# Patient Record
Sex: MALE | Race: WHITE | NOT HISPANIC OR LATINO | Employment: OTHER | URBAN - METROPOLITAN AREA
[De-identification: names, ages, dates, MRNs, and addresses within clinical notes are randomized per-mention and may not be internally consistent; named-entity substitution may affect disease eponyms.]

---

## 2020-01-15 ENCOUNTER — TELEPHONE (OUTPATIENT)
Dept: GERIATRICS | Age: 65
End: 2020-01-15

## 2020-01-15 NOTE — TELEPHONE ENCOUNTER
[address]  [phone]  Telephone Intake     Referral Source: Postcard       Patients PCP: currently Joellen Pulliam,  (Intends to change to Dr Wojciech Thomas after Burke Roger Assess  PCP phone number: in chart    Caller (and relationship to patient): OWEN Reddy 20 Person (relationship to patient): Wife   Phone Number: [phone]   Is caller POA? no (please provide wife with information concerning POA, or any other suggestions that will help with this area)    Reason for referral: Memory Loss, fairly advanced dementia    Others residing with patient: Spouse    Has the patient ever been formally tested for memory/dementia? Yes     Has the patient ever been diagnosed with dementia? Yes     Has the patient been seen by a Neurologist? Yes     What is the goal of visit? Wife needs help with care giving direction  Would like to bring in help during the week so wife can go out of the house  Also needs approval of physician for the long term health insurance the family has so that home care will be covered by this insurance  Preferred language? English  Highest education level?  (Denis Marsh)  Can patient read English? Yes     First Visit: 1/21/20 @ 1pm  Conference Visit: 2/11/20 @ 1pm    This patient received our POSTCARD  They will also like to see Dr Wojciech Thomas for PCP and establish care   Need to schedule appointment after Sharene Case

## 2020-01-21 ENCOUNTER — OFFICE VISIT (OUTPATIENT)
Dept: GERIATRICS | Age: 65
End: 2020-01-21
Payer: MEDICARE

## 2020-01-21 VITALS
BODY MASS INDEX: 23.24 KG/M2 | HEART RATE: 78 BPM | SYSTOLIC BLOOD PRESSURE: 102 MMHG | DIASTOLIC BLOOD PRESSURE: 60 MMHG | TEMPERATURE: 96.4 F | HEIGHT: 66 IN | WEIGHT: 144.6 LBS | OXYGEN SATURATION: 78 %

## 2020-01-21 DIAGNOSIS — R26.2 AMBULATORY DYSFUNCTION: ICD-10-CM

## 2020-01-21 DIAGNOSIS — G30.0 EARLY ONSET ALZHEIMER'S DEMENTIA WITHOUT BEHAVIORAL DISTURBANCE (HCC): Primary | ICD-10-CM

## 2020-01-21 DIAGNOSIS — F33.0 MILD EPISODE OF RECURRENT MAJOR DEPRESSIVE DISORDER (HCC): ICD-10-CM

## 2020-01-21 DIAGNOSIS — F02.80 EARLY ONSET ALZHEIMER'S DEMENTIA WITHOUT BEHAVIORAL DISTURBANCE (HCC): Primary | ICD-10-CM

## 2020-01-21 DIAGNOSIS — R63.4 WEIGHT LOSS: ICD-10-CM

## 2020-01-21 DIAGNOSIS — R15.9 FULL INCONTINENCE OF FECES: ICD-10-CM

## 2020-01-21 DIAGNOSIS — H54.7 VISUAL IMPAIRMENT: ICD-10-CM

## 2020-01-21 PROCEDURE — 99205 OFFICE O/P NEW HI 60 MIN: CPT | Performed by: STUDENT IN AN ORGANIZED HEALTH CARE EDUCATION/TRAINING PROGRAM

## 2020-01-21 RX ORDER — ACETAMINOPHEN 325 MG/1
325 TABLET ORAL EVERY 6 HOURS PRN
COMMUNITY
End: 2020-07-15 | Stop reason: ALTCHOICE

## 2020-01-21 RX ORDER — SERTRALINE HYDROCHLORIDE 100 MG/1
100 TABLET, FILM COATED ORAL
COMMUNITY
Start: 2019-12-09

## 2020-01-21 RX ORDER — IBUPROFEN 200 MG
200 TABLET ORAL EVERY 6 HOURS PRN
COMMUNITY
End: 2020-01-22
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
## 2020-01-22 PROBLEM — F33.0 MILD EPISODE OF RECURRENT MAJOR DEPRESSIVE DISORDER (HCC): Status: ACTIVE | Noted: 2020-01-22

## 2020-01-22 PROBLEM — F02.80 EARLY ONSET ALZHEIMER'S DEMENTIA WITHOUT BEHAVIORAL DISTURBANCE (HCC): Status: ACTIVE | Noted: 2020-01-22

## 2020-01-22 PROBLEM — R63.4 WEIGHT LOSS: Status: ACTIVE | Noted: 2020-01-22

## 2020-01-22 PROBLEM — R15.9 INCONTINENCE OF FECES: Status: ACTIVE | Noted: 2020-01-22

## 2020-01-22 PROBLEM — H54.7 VISUAL IMPAIRMENT: Status: ACTIVE | Noted: 2020-01-22

## 2020-01-22 PROBLEM — R26.2 AMBULATORY DYSFUNCTION: Status: ACTIVE | Noted: 2020-01-22

## 2020-01-22 PROBLEM — G30.0 EARLY ONSET ALZHEIMER'S DEMENTIA WITHOUT BEHAVIORAL DISTURBANCE (HCC): Status: ACTIVE | Noted: 2020-01-22

## 2020-01-22 PROBLEM — F32.A DEPRESSION: Status: ACTIVE | Noted: 2020-01-22

## 2020-01-22 NOTE — ASSESSMENT & PLAN NOTE
Wife declines referral to Gastroenterology  Recommend scheduled toileting every 2-3 hours once patient is awake

## 2020-01-22 NOTE — ASSESSMENT & PLAN NOTE
Patient able to complete GDS  pHq-2 positive based on wife's history  Encourage patient remain active mentally, physically and socially  Continue Zoloft 100 mg daily  Patient has not had suicidal or homicidal ideation  Continued social support by family and friends  Would also recommend patient follow-up with Psychiatry the wife has declined this  Recommend referral to nursing home for admission to a locked dementia unit

## 2020-01-22 NOTE — ASSESSMENT & PLAN NOTE
MOCA 0, TUGT 16, patient unable to answer questionnaire for GDS  Patient with known history of dementia likely mixed in etiology frontotemporal lobe dementia versus Alzheimer's dementia  Given patient's early onset of symptoms in his 46s and strong family history of early-onset dementia, per Neurology and geriatrics, unsure of  definitive diagnosis  Wife had declined PET scan in the past to differentiate between these 2 diagnoses  Progressive decline in patient's [place] scores from 2014 with a score of 28, MMSE 13 in 2019, Slums 8 in 2019 to now being unable to complete the [facility] today  Patient is fully dependent in all ADLs and IADLs and has begun resisting cares with worsening urinary and stool incontinence  Wife declines any intensive workup including blood or imaging at this time as she explains she has completed this in the past   Given patient's current level of severe dementia and decreased functionality with global aphasia, it was recommended to patient's wife that he meets the criteria for a higher level of care and I would recommend him being placed into a nursing home facility, more specifically a locked dementia unit as he does have a high risk of wandering and requires 24/7 supervised care  Wife explained that she did look at assisted living facilities however I disagreed with placement into an assisted living facility as he requires a higher level of care in a nursing home    MSW from our office to reach out to patient's wife to discuss resources available  Wife to contact her long-term care insurance about coverage start date  Also recommended a temporary home health agency to assist in cares of patient so that wife may be able to go out and assess nursing homes she would like the patient placed in  Patient does not have any aggressive behaviors at this time  Will continue current regimen of Zoloft 100 mg daily as wife is hesitant to start new medications  Wife did inquire about restarting  Aricept which I advised against given side effect profile and prior inefficacy in his past treatment  Studies have shown only small benefits on some but not all cognitive and functional outcomes in short-term trials with Aricept in patients with advanced dementia  Additionally data do not support the use of cholinesterase inhibitors in FTD except if there is uncertainty about whether the patient has FTD or Alzheimer's disease which has already been tried in this patient and failed     Recommend reorientation and redirection as needed  Manage chronic conditions per PCP  Maintain [place] precautions  Encourage patient remain active mentally, physically and socially  Also encourage patient to participate in cognitively challenging exercises such as cross words and puzzles  Given patient's late stage of dementia at this time, he will would likely not benefit from a referral to speech therapy for cognitive memory recall  Wife to send paperwork for admission to nursing home of her choice remainder complete  Recommend wife also speak with an  to finalize power of  and Living Will for patient  Advised any changes in behaviors acutely to call my office  Wife would benefit from a care support group  Will follow-up as needed

## 2020-01-22 NOTE — ASSESSMENT & PLAN NOTE
TUGT 16 sec  Slowed gait  Patient may benefit from referral to physical therapy however given his severity of dementia and constant need for reorientation and redirection, patient would likely not be able to follow commands with physical therapy  Maintain Falls precautions

## 2020-01-22 NOTE — ASSESSMENT & PLAN NOTE
Manage chronic conditions  Maintain Falls precautions  Discussed with wife in detail the importance of following up with an ophthalmology appointment as this may decrease patient's confusion given his marked visual impairment  Wife agreed to schedule an appointment

## 2020-01-22 NOTE — ASSESSMENT & PLAN NOTE
Weight loss attributed to marked decrease in oral intake  Encourage supplements with Ensure  Wife declines any aggressive workup today for blood work, imaging or referral to other subspecialties  Encourage oral intake  Patient may benefit from mirtazapine however wife hesitant about starting any new medications today  Follow up with PCP for continued monitoring

## 2020-01-23 ENCOUNTER — TELEPHONE (OUTPATIENT)
Dept: GERIATRICS | Age: 65
End: 2020-01-23

## 2020-01-23 NOTE — TELEPHONE ENCOUNTER
Spoke with Manny Johnson today as Dr Alvaro Beasley notes she is in need of resources for in-home care and senior placement for Marvin Toney  I spoke about options with Mina Dewitt and she mentioned having talked with [facility]  I will email her home-care options ([facility], [facility], and [facility]) and senior placement options ([facility], [facility], and [facility]) and provide her with my direct line for future contact       Heena's email address is [email or web address]

## 2020-02-14 ENCOUNTER — TELEPHONE (OUTPATIENT)
Dept: GERIATRICS | Age: 65
End: 2020-02-14

## 2020-02-14 NOTE — TELEPHONE ENCOUNTER
Spoke with Robert Cooney twice, once  To explain as Dr Daryn Villanueva requested earlier in this thread  As she explained she has been very actively seeking placement as recommended by Dr Daryn Villanueva her hesitation for making the actual switch of Primary care providers until this process is finalized  Once again to schedule an appointment    She accepted the need for the EKG as good news and we scheduled for Monday late morning

## 2020-02-14 NOTE — TELEPHONE ENCOUNTER
He would need to be assessed and have an EKG and bloodwork  Wife should also be aware that the side effects of antipsycotics need to be discussed in detail and it is not a medicine I would start over the phone without assessing the pt  An MA should call her back about this    Thanks

## 2020-02-14 NOTE — TELEPHONE ENCOUNTER
Wife stopped in and inquired about something to help Filippo salas  Currently takes Melatonin, but relayed that it does not help  Wife heard of Seroquel and wondered if this would be a possibility     Reports that frequently, he cannot fall asleep until about 3am or 4am

## 2020-02-17 ENCOUNTER — OFFICE VISIT (OUTPATIENT)
Dept: GERIATRICS | Age: 65
End: 2020-02-17
Payer: MEDICARE

## 2020-02-17 ENCOUNTER — APPOINTMENT (OUTPATIENT)
Dept: LAB | Facility: CLINIC | Age: 65
End: 2020-02-17
Payer: MEDICARE

## 2020-02-17 VITALS
BODY MASS INDEX: 23.42 KG/M2 | WEIGHT: 149.2 LBS | TEMPERATURE: 98 F | OXYGEN SATURATION: 97 % | SYSTOLIC BLOOD PRESSURE: 108 MMHG | HEART RATE: 76 BPM | HEIGHT: 67 IN | RESPIRATION RATE: 12 BRPM | DIASTOLIC BLOOD PRESSURE: 68 MMHG

## 2020-02-17 DIAGNOSIS — H54.7 VISUAL IMPAIRMENT: ICD-10-CM

## 2020-02-17 DIAGNOSIS — F02.80 EARLY ONSET ALZHEIMER'S DEMENTIA WITHOUT BEHAVIORAL DISTURBANCE (HCC): Primary | ICD-10-CM

## 2020-02-17 DIAGNOSIS — R26.2 AMBULATORY DYSFUNCTION: ICD-10-CM

## 2020-02-17 DIAGNOSIS — F02.80 EARLY ONSET ALZHEIMER'S DEMENTIA WITHOUT BEHAVIORAL DISTURBANCE (HCC): ICD-10-CM

## 2020-02-17 DIAGNOSIS — G47.09 OTHER INSOMNIA: ICD-10-CM

## 2020-02-17 DIAGNOSIS — G30.0 EARLY ONSET ALZHEIMER'S DEMENTIA WITHOUT BEHAVIORAL DISTURBANCE (HCC): Primary | ICD-10-CM

## 2020-02-17 DIAGNOSIS — G30.0 EARLY ONSET ALZHEIMER'S DEMENTIA WITHOUT BEHAVIORAL DISTURBANCE (HCC): ICD-10-CM

## 2020-02-17 DIAGNOSIS — Z23 NEED FOR INFLUENZA VACCINATION: ICD-10-CM

## 2020-02-17 DIAGNOSIS — F33.0 MILD EPISODE OF RECURRENT MAJOR DEPRESSIVE DISORDER (HCC): ICD-10-CM

## 2020-02-17 DIAGNOSIS — R15.9 FULL INCONTINENCE OF FECES: ICD-10-CM

## 2020-02-17 LAB
ALBUMIN SERPL BCP-MCNC: 3.8 G/DL (ref 3.5–5)
ALP SERPL-CCNC: 55 U/L (ref 46–116)
ALT SERPL W P-5'-P-CCNC: 23 U/L (ref 12–78)
ANION GAP SERPL CALCULATED.3IONS-SCNC: 3 MMOL/L (ref 4–13)
AST SERPL W P-5'-P-CCNC: 13 U/L (ref 5–45)
BASOPHILS # BLD AUTO: 0.07 THOUSANDS/ΜL (ref 0–0.1)
BASOPHILS NFR BLD AUTO: 1 % (ref 0–1)
BILIRUB SERPL-MCNC: 0.47 MG/DL (ref 0.2–1)
BUN SERPL-MCNC: 32 MG/DL (ref 5–25)
CALCIUM SERPL-MCNC: 8.9 MG/DL (ref 8.3–10.1)
CHLORIDE SERPL-SCNC: 112 MMOL/L (ref 100–108)
CO2 SERPL-SCNC: 25 MMOL/L (ref 21–32)
CREAT SERPL-MCNC: 0.74 MG/DL (ref 0.6–1.3)
EOSINOPHIL # BLD AUTO: 0.14 THOUSAND/ΜL (ref 0–0.61)
EOSINOPHIL NFR BLD AUTO: 2 % (ref 0–6)
ERYTHROCYTE [DISTWIDTH] IN BLOOD BY AUTOMATED COUNT: 14.4 % (ref 11.6–15.1)
GFR SERPL CREATININE-BSD FRML MDRD: 96 ML/MIN/1.73SQ M
GLUCOSE SERPL-MCNC: 108 MG/DL (ref 65–140)
HCT VFR BLD AUTO: 38.6 % (ref 36.5–49.3)
HGB BLD-MCNC: 12.5 G/DL (ref 12–17)
IMM GRANULOCYTES # BLD AUTO: 0.02 THOUSAND/UL (ref 0–0.2)
IMM GRANULOCYTES NFR BLD AUTO: 0 % (ref 0–2)
LYMPHOCYTES # BLD AUTO: 1 THOUSANDS/ΜL (ref 0.6–4.47)
LYMPHOCYTES NFR BLD AUTO: 15 % (ref 14–44)
MCH RBC QN AUTO: 32.2 PG (ref 26.8–34.3)
MCHC RBC AUTO-ENTMCNC: 32.4 G/DL (ref 31.4–37.4)
MCV RBC AUTO: 100 FL (ref 82–98)
MONOCYTES # BLD AUTO: 0.57 THOUSAND/ΜL (ref 0.17–1.22)
MONOCYTES NFR BLD AUTO: 8 % (ref 4–12)
NEUTROPHILS # BLD AUTO: 4.99 THOUSANDS/ΜL (ref 1.85–7.62)
NEUTS SEG NFR BLD AUTO: 74 % (ref 43–75)
NRBC BLD AUTO-RTO: 0 /100 WBCS
PLATELET # BLD AUTO: 241 THOUSANDS/UL (ref 149–390)
PMV BLD AUTO: 11.3 FL (ref 8.9–12.7)
POTASSIUM SERPL-SCNC: 4 MMOL/L (ref 3.5–5.3)
PROT SERPL-MCNC: 6.8 G/DL (ref 6.4–8.2)
RBC # BLD AUTO: 3.88 MILLION/UL (ref 3.88–5.62)
SODIUM SERPL-SCNC: 140 MMOL/L (ref 136–145)
TSH SERPL DL<=0.05 MIU/L-ACNC: 1.37 UIU/ML (ref 0.36–3.74)
WBC # BLD AUTO: 6.79 THOUSAND/UL (ref 4.31–10.16)

## 2020-02-17 PROCEDURE — 90662 IIV NO PRSV INCREASED AG IM: CPT | Performed by: STUDENT IN AN ORGANIZED HEALTH CARE EDUCATION/TRAINING PROGRAM

## 2020-02-17 PROCEDURE — 85025 COMPLETE CBC W/AUTO DIFF WBC: CPT

## 2020-02-17 PROCEDURE — 80053 COMPREHEN METABOLIC PANEL: CPT

## 2020-02-17 PROCEDURE — 84443 ASSAY THYROID STIM HORMONE: CPT

## 2020-02-17 PROCEDURE — 36415 COLL VENOUS BLD VENIPUNCTURE: CPT

## 2020-02-17 PROCEDURE — 99215 OFFICE O/P EST HI 40 MIN: CPT | Performed by: STUDENT IN AN ORGANIZED HEALTH CARE EDUCATION/TRAINING PROGRAM

## 2020-02-17 PROCEDURE — G0008 ADMIN INFLUENZA VIRUS VAC: HCPCS | Performed by: STUDENT IN AN ORGANIZED HEALTH CARE EDUCATION/TRAINING PROGRAM

## 2020-02-17 RX ORDER — ASPIRIN 81 MG/1
81 TABLET ORAL DAILY
COMMUNITY
End: 2020-07-15 | Stop reason: ALTCHOICE

## 2020-02-17 RX ORDER — QUETIAPINE FUMARATE 25 MG/1
12.5 TABLET, FILM COATED ORAL DAILY
Qty: 30 TABLET | Refills: 0 | Status: SHIPPED | OUTPATIENT
Start: 2020-02-17 | End: 2020-07-15 | Stop reason: ALTCHOICE

## 2020-02-17 NOTE — PROGRESS NOTES
[facility]  [address] [facility], [address] [place] [address], [address] [place] [address]    PRIMARY CARE PRACTICE FOR OLDER ADULTS      NAME: Cristopher Aragon  AGE: 75 y o  SEX: male    DATE OF ENCOUNTER:  02/17/2020    Assessment and Plan     Problem List Items Addressed This Visit        Nervous and Auditory    Early onset Alzheimer's dementia without behavioral disturbance ([facility] [address] ) - Primary     MocA 0, 2nd attempt  Patient currently on Zoloft 100 mg daily  Will start Seroquel 12 5 mg at nighttime  EKG done in office today showed   Will order a CMP to determine sodium levels  Patient to follow up in 1 month for medication assessment  Wife to call the office should any worsening of behaviors occur since upon starting Seroquel  Educated wife in detail on side effect profile of antipsychotic therapy such as Seroquel and increased risk of death in patients with dementia  Given patient's current severity of dementia and behavioral disturbance, wife is in agreement to give a trial with antipsychotic therapy  Recommend discontinuation of melatonin so that patient is more awake during the daytime to participate in cognitive activities  Forms filled out for respite admission to [facility] dementia unit  POLST completed with comfort care measures as a goal and scanned  Recommend reorientation and redirection as needed  Manage chronic conditions  Maintain Falls precautions  Encourage patient remain active mentally, physically and socially  Encourage patient to participate in cognitively challenging exercises such as cross words and puzzles  Agree completely with patient moving to a higher level of care  Discussed with patient's wife that patient does require a nursing home level of care as patient does should require 24/7 supervision and clinical staff  Wife explains that she would like to place the patient in a dementia unit initially and will make a final decision after his respite time    Discontinue melatonin as patient has been excessively drowsy during the daytime and is unable to participate in social and neurocognitive activities with his home health aide staff               Relevant Medications    QUEtiapine (SEROquel) 25 mg tablet    Other Relevant Orders    CBC and differential (Completed)    Comprehensive metabolic panel (Completed)       Other    Visual impairment     Maintain Falls precautions  Patient to follow-up with Ophthalmology as scheduled as an outpatient         Incontinence of feces     Recommend scheduled toileting every 2-3 hours once awake  Will continue to monitor           Ambulatory dysfunction     TUGT 16 sec  Patient has persistingly slowed gait  Wife did not follow through with physical therapy referral and declines at this time as patient will be going into respite care  Maintain Falls precautions         Mild episode of recurrent major depressive disorder ([facility] [address] )     Mood has remained stable per wife  Patient has not had any suicidal or homicidal ideation  Patient currently has home health aides coming in and he does participate in social activities with them  Patient will be going to a 2 week respite trial at [address] dementia unit  Will continue Zoloft 100 mg daily for now  Will start Seroquel 12 5 mg daily  Will order a CMP to monitor sodium levels and EKG showed QTC of 437  Will continue to follow         Relevant Medications    QUEtiapine (SEROquel) 25 mg tablet    Other Relevant Orders    TSH, 3rd generation with T4 reflex (Completed)    Other insomnia     Discontinue melatonin as patient has been excessively sleepy during the daytime and is unable to participate in social and cognitive activities with his home health aides  Will start Seroquel 12 5 mg p o  HS as this may also proved to be beneficial with the patient's sleeping pattern  Wife educated on importance of having the patient avoid daytime napping  Avoid caffeinated beverages after 14:00  Will continue to follow Relevant Orders    TSH, 3rd generation with T4 reflex (Completed)      Other Visit Diagnoses     Need for influenza vaccination        Relevant Orders    influenza vaccine, [id number], high-dose, PF 0 5 mL (FLUZONE HIGH-DOSE) (Completed)                - Counseling Documentation: patient was counseled regarding: diagnostic results, instructions for management, risk factor reductions, prognosis, patient and family education, impressions, risks and benefits of treatment options and importance of compliance with treatment  Discussed with patient's wife    Chief Complaint     Patient presents to establish care at our primary care practice for older adults and is also being seen for follow-up of his dementia    History of Present Illness     HPI  This is a 76-year-old male with depression, early-onset severe Alzheimer's disease with behavioral disturbance, insomnia, urine and stool incontinence, vision impairment and ambulatory dysfunction who presents to establish care at our primary care practice for older adults  Patient was previously seen for a comprehensive geriatric assessment with a diagnosis of early-onset dementia secondary to severe Alzheimer's etiology with behavioral disturbance  At that visit, it was advised that the patient be transferred to higher level of care as the patient's wife appeared overwhelmed and the situation was assessed that it was in both the patient and his wife's best interest that he be transferred to a higher level of care  Today the patient's wife has finally decided she will place the patient for 2 weeks respite at [address] [place] [address] dementia unit  Despite having home health services, she has now realized at that patient does require a higher level of care  The patient does have long-term care insurance  The wife has also brought a POLST form to be completed with a goal of comfort care measures  Patient's wife is his legal POA    Wife explains that patient has had no improvement in his symptoms  He has now begun sundowning from 16:00  Behaviors include an insatiable appetite which begins by him arguing with her in the late evenings and eating everything in his sight  She gives examples of the patient eating raw eggs, drinking a bottle of maple syrup and requesting to eat raw meat as he is too hungry to wait for her prepared meals  She describes him as manic during this time  Both the patient and wife get into fights daily as wife explains that she argues with the patient in an effort to reorient him so that he avoids becoming sick from eating raw foods  His memory continues to progressively decline and he is minimally verbal during most of the day  He continues to have urinary and stool incontinence and has persistently resisted her care for taking showers, medications or participating in cognitive activities  Wife explains that one of her family friends suggested using Seroquel as well as the admission team at [address] [place] [address] dementia unit  As a result of which she is here today to address his behavioral changes  The patient was also recently started on melatonin 1 week ago after which she was noted to be sleeping excessively during the daytime  Per wife, the patient typically stays awake until 1-2 a m  And then subsequently sleeps until 09:30 since starting melatonin  Home health aides who visit during the daytime over the last few days have noted the patient to be excessively drowsy as a result of which he is not able to participate in usual activities with them during the daytime        The following portions of the patient's history were reviewed and updated as appropriate: allergies, current medications, past family history, past medical history, past social history, past surgical history and problem list     Review of Systems     Review of Systems   Unable to perform ROS: Dementia       Active Problem List     Patient Active Problem List   Diagnosis    Early onset Alzheimer's dementia without behavioral disturbance (HCC)    Visual impairment    Incontinence of feces    Weight loss    Ambulatory dysfunction    Mild episode of recurrent major depressive disorder (HCC)    Other insomnia       Objective     /68 (BP Location: Left arm, Patient Position: Sitting, Cuff Size: Adult)   Pulse 76   Temp 98 °F (36 7 °C) (Temporal)   Resp 12   Ht 5' 7" (1 702 m) Comment: with shoes  Wt 67 7 kg (149 lb 3 2 oz) Comment: with shoes  SpO2 97%   BMI 23 37 kg/m²     Physical Exam   Constitutional: No distress  Male patient sitting quietly on chair, intermittently falling asleep, in no obvious cardiorespiratory or painful distress   HENT:   Head: Normocephalic and atraumatic  Right Ear: External ear normal    Left Ear: External ear normal    Nose: Nose normal    Eyes: Conjunctivae are normal  Right eye exhibits no discharge  Left eye exhibits no discharge  No scleral icterus  Neck: Normal range of motion  Neck supple  No JVD present  Cardiovascular: Normal rate, regular rhythm, normal heart sounds and intact distal pulses  Exam reveals no gallop and no friction rub  No murmur heard  Pulmonary/Chest: Effort normal and breath sounds normal  No stridor  No respiratory distress  He has no wheezes  He has no rales  Abdominal: Soft  Bowel sounds are normal  He exhibits no distension and no mass  There is no tenderness  There is no guarding  Musculoskeletal: Normal range of motion  He exhibits no edema, tenderness or deformity  Neurological: He is alert  He has normal reflexes  He exhibits normal muscle tone  Patient unable to verbalize his name  Oriented x 0  Moving all limbs  Unable to follow commands as during prior visit  Very slowed gait   Skin: Skin is warm  No rash noted  He is not diaphoretic  No erythema  No pallor  Psychiatric:   Patient pleasantly confused at baseline   Vitals reviewed        Pertinent Laboratory/Diagnostic Studies:        Current Medications Current Outpatient Medications:     acetaminophen (TYLENOL) 325 mg tablet, Take 325 mg by mouth every 6 (six) hours as needed, Disp: , Rfl:     ACIDOPHILUS LACTOBACILLUS PO, Take by mouth, Disp: , Rfl:     aspirin (ECOTRIN LOW STRENGTH) 81 mg EC tablet, Take 81 mg by mouth daily, Disp: , Rfl:     KRILL OIL PO, Take by mouth daily, Disp: , Rfl:     Multiple Vitamins-Minerals (MULTIVITAMIN ADULTS PO), , Disp: , Rfl:     sertraline (ZOLOFT) 100 mg tablet, Take 100 mg by mouth, Disp: , Rfl:     TURMERIC PO, , Disp: , Rfl:     QUEtiapine (SEROquel) 25 mg tablet, Take 0 5 tablets (12 5 mg total) by mouth daily, Disp: 30 tablet, Rfl: 0    Health Maintenance     Health Maintenance   Topic Date Due    Hepatitis C Screening  1955    Medicare Annual Wellness Visit (AWV)  1955    CRC Screening: Colonoscopy  1955    Fall Risk  03/31/2018    Pneumococcal Vaccine: 65+ Years (1 of 2 - PCV13) 03/31/2018    BMI: Adult  02/17/2021    DTaP,Tdap,and Td Vaccines (2 - Td) 01/23/2027    Influenza Vaccine  Completed    Pneumococcal Vaccine: Pediatrics (0 to 5 Years) and At-Risk Patients (6 to 59 Years)  Aged Out    HIB Vaccine  Aged Out    Hepatitis B Vaccine  Aged Out    IPV Vaccine  Aged Out    Hepatitis A Vaccine  Aged Out    Meningococcal ACWY Vaccine  Aged Out    HPV Vaccine  Aged [employer] History   Administered Date(s) Administered    Influenza, high dose seasonal 0 5 mL 02/17/2020       Lucero Marie MD  Geriatrics   2/18/2020 7:24 AM

## 2020-02-18 NOTE — ASSESSMENT & PLAN NOTE
Mood has remained stable per wife  Patient has not had any suicidal or homicidal ideation  Patient currently has home health aides coming in and he does participate in social activities with them  Patient will be going to a 2 week respite trial at [address] dementia unit  Will continue Zoloft 100 mg daily for now  Will start Seroquel 12 5 mg daily  Will order a CMP to monitor sodium levels and EKG showed QTC of 437  Will continue to follow

## 2020-02-18 NOTE — ASSESSMENT & PLAN NOTE
Discontinue melatonin as patient has been excessively sleepy during the daytime and is unable to participate in social and cognitive activities with his home health aides  Will start Seroquel 12 5 mg p o  HS as this may also proved to be beneficial with the patient's sleeping pattern  Wife educated on importance of having the patient avoid daytime napping  Avoid caffeinated beverages after 14:00  Will continue to follow

## 2020-02-18 NOTE — ASSESSMENT & PLAN NOTE
MocA 0, 2nd attempt  Patient currently on Zoloft 100 mg daily  Will start Seroquel 12 5 mg at nighttime  EKG done in office today showed   Will order a CMP to determine sodium levels  Patient to follow up in 1 month for medication assessment  Wife to call the office should any worsening of behaviors occur since upon starting Seroquel  Educated wife in detail on side effect profile of antipsychotic therapy such as Seroquel and increased risk of death in patients with dementia  Given patient's current severity of dementia and behavioral disturbance, wife is in agreement to give a trial with antipsychotic therapy  Recommend discontinuation of melatonin so that patient is more awake during the daytime to participate in cognitive activities  Forms filled out for respite admission to [facility] dementia unit  POLST completed with comfort care measures as a goal and scanned  Recommend reorientation and redirection as needed  Manage chronic conditions  Maintain Falls precautions  Encourage patient remain active mentally, physically and socially  Encourage patient to participate in cognitively challenging exercises such as cross words and puzzles  Agree completely with patient moving to a higher level of care  Discussed with patient's wife that patient does require a nursing home level of care as patient does should require 24/7 supervision and clinical staff  Wife explains that she would like to place the patient in a dementia unit initially and will make a final decision after his respite time  Discontinue melatonin as patient has been excessively drowsy during the daytime and is unable to participate in social and neurocognitive activities with his home health aide staff

## 2020-02-18 NOTE — ASSESSMENT & PLAN NOTE
TUGT 16 sec  Patient has persistingly slowed gait  Wife did not follow through with physical therapy referral and declines at this time as patient will be going into respite care  Maintain Falls precautions

## 2020-03-11 ENCOUNTER — TELEPHONE (OUTPATIENT)
Dept: GERIATRICS | Age: 65
End: 2020-03-11

## 2020-03-11 NOTE — TELEPHONE ENCOUNTER
Patient's wife stopped into office to relay that she needs to cancel the appointment for 3/16/20  Patient is at [facility] for the short term; but is deciding to keep him there for long term  Cancelled 3/16/20 appointment  Wife is unsure if Lissy García should be followed by the Providers at [facility] or if she should try to bring him here to this office  Please advise

## 2020-03-12 NOTE — TELEPHONE ENCOUNTER
Left voicemail message on patient's wife's cell phone  Relayed information, provided practice phone number and requested she call if she has further questions

## 2020-03-12 NOTE — TELEPHONE ENCOUNTER
Usually if there is a provider at the facility, it's more convenient for the pt to see their physician, however if she wants, I can certainly keep seeing him at my office  Not sure what [facility] policy is either, so it also depends on that  There may be a PCP there but she can come to us in the capacity of geriatrics speciality if she wishes

## 2020-05-09 DIAGNOSIS — F02.80 EARLY ONSET ALZHEIMER'S DEMENTIA WITHOUT BEHAVIORAL DISTURBANCE (HCC): ICD-10-CM

## 2020-05-09 DIAGNOSIS — G30.0 EARLY ONSET ALZHEIMER'S DEMENTIA WITHOUT BEHAVIORAL DISTURBANCE (HCC): ICD-10-CM

## 2020-05-09 RX ORDER — QUETIAPINE FUMARATE 25 MG/1
TABLET, FILM COATED ORAL
Qty: 30 TABLET | Refills: 0 | OUTPATIENT
Start: 2020-05-09

## 2020-06-17 ENCOUNTER — NURSING HOME VISIT (OUTPATIENT)
Dept: GERIATRICS | Facility: OTHER | Age: 65
End: 2020-06-17
Payer: MEDICARE

## 2020-06-17 VITALS
RESPIRATION RATE: 16 BRPM | OXYGEN SATURATION: 96 % | BODY MASS INDEX: 22.02 KG/M2 | DIASTOLIC BLOOD PRESSURE: 92 MMHG | WEIGHT: 140.6 LBS | TEMPERATURE: 98 F | HEART RATE: 82 BPM | SYSTOLIC BLOOD PRESSURE: 146 MMHG

## 2020-06-17 DIAGNOSIS — G31.09 FRONTOTEMPORAL DEMENTIA (HCC): Primary | ICD-10-CM

## 2020-06-17 DIAGNOSIS — L85.3 XEROSIS CUTIS: ICD-10-CM

## 2020-06-17 DIAGNOSIS — R26.2 AMBULATORY DYSFUNCTION: ICD-10-CM

## 2020-06-17 DIAGNOSIS — G47.09 OTHER INSOMNIA: ICD-10-CM

## 2020-06-17 DIAGNOSIS — N17.9 AKI (ACUTE KIDNEY INJURY) (HCC): ICD-10-CM

## 2020-06-17 DIAGNOSIS — F02.80 FRONTOTEMPORAL DEMENTIA (HCC): Primary | ICD-10-CM

## 2020-06-17 DIAGNOSIS — R91.8 GROUND GLASS OPACITY PRESENT ON IMAGING OF LUNG: ICD-10-CM

## 2020-06-17 PROBLEM — K56.41 FECAL IMPACTION (HCC): Status: ACTIVE | Noted: 2020-05-24

## 2020-06-17 PROCEDURE — 99305 1ST NF CARE MODERATE MDM 35: CPT | Performed by: FAMILY MEDICINE

## 2020-06-18 PROBLEM — L85.3 XEROSIS CUTIS: Status: ACTIVE | Noted: 2020-06-18

## 2020-06-19 ENCOUNTER — NURSING HOME VISIT (OUTPATIENT)
Dept: GERIATRICS | Facility: OTHER | Age: 65
End: 2020-06-19
Payer: MEDICARE

## 2020-06-19 DIAGNOSIS — G31.09 FRONTOTEMPORAL DEMENTIA (HCC): ICD-10-CM

## 2020-06-19 DIAGNOSIS — F02.80 FRONTOTEMPORAL DEMENTIA (HCC): ICD-10-CM

## 2020-06-19 DIAGNOSIS — U07.1 PNEUMONIA DUE TO COVID-19 VIRUS: Primary | ICD-10-CM

## 2020-06-19 DIAGNOSIS — R26.2 AMBULATORY DYSFUNCTION: ICD-10-CM

## 2020-06-19 DIAGNOSIS — N17.9 AKI (ACUTE KIDNEY INJURY) (HCC): ICD-10-CM

## 2020-06-19 DIAGNOSIS — J12.82 PNEUMONIA DUE TO COVID-19 VIRUS: Primary | ICD-10-CM

## 2020-06-19 PROCEDURE — 99309 SBSQ NF CARE MODERATE MDM 30: CPT | Performed by: NURSE PRACTITIONER

## 2020-06-22 ENCOUNTER — NURSING HOME VISIT (OUTPATIENT)
Dept: GERIATRICS | Facility: OTHER | Age: 65
End: 2020-06-22
Payer: MEDICARE

## 2020-06-22 DIAGNOSIS — G31.09 FRONTOTEMPORAL DEMENTIA (HCC): Primary | ICD-10-CM

## 2020-06-22 DIAGNOSIS — R26.2 AMBULATORY DYSFUNCTION: ICD-10-CM

## 2020-06-22 DIAGNOSIS — F02.80 FRONTOTEMPORAL DEMENTIA (HCC): Primary | ICD-10-CM

## 2020-06-22 DIAGNOSIS — N17.9 AKI (ACUTE KIDNEY INJURY) (HCC): ICD-10-CM

## 2020-06-22 DIAGNOSIS — U07.1 COVID-19 VIRUS DETECTED: ICD-10-CM

## 2020-06-22 PROCEDURE — 99309 SBSQ NF CARE MODERATE MDM 30: CPT | Performed by: FAMILY MEDICINE

## 2020-06-25 ENCOUNTER — NURSING HOME VISIT (OUTPATIENT)
Dept: GERIATRICS | Facility: OTHER | Age: 65
End: 2020-06-25
Payer: MEDICARE

## 2020-06-25 DIAGNOSIS — U07.1 PNEUMONIA DUE TO COVID-19 VIRUS: Primary | ICD-10-CM

## 2020-06-25 DIAGNOSIS — N17.9 AKI (ACUTE KIDNEY INJURY) (HCC): ICD-10-CM

## 2020-06-25 DIAGNOSIS — K56.41 FECAL IMPACTION (HCC): ICD-10-CM

## 2020-06-25 DIAGNOSIS — G31.09 FRONTOTEMPORAL DEMENTIA (HCC): ICD-10-CM

## 2020-06-25 DIAGNOSIS — J12.82 PNEUMONIA DUE TO COVID-19 VIRUS: Primary | ICD-10-CM

## 2020-06-25 DIAGNOSIS — R26.2 AMBULATORY DYSFUNCTION: ICD-10-CM

## 2020-06-25 DIAGNOSIS — F02.80 FRONTOTEMPORAL DEMENTIA (HCC): ICD-10-CM

## 2020-06-25 PROCEDURE — 99309 SBSQ NF CARE MODERATE MDM 30: CPT | Performed by: NURSE PRACTITIONER

## 2020-06-29 ENCOUNTER — NURSING HOME VISIT (OUTPATIENT)
Dept: GERIATRICS | Facility: OTHER | Age: 65
End: 2020-06-29
Payer: MEDICARE

## 2020-06-29 DIAGNOSIS — F02.80 FRONTOTEMPORAL DEMENTIA (HCC): Primary | ICD-10-CM

## 2020-06-29 DIAGNOSIS — R26.2 AMBULATORY DYSFUNCTION: ICD-10-CM

## 2020-06-29 DIAGNOSIS — G31.09 FRONTOTEMPORAL DEMENTIA (HCC): Primary | ICD-10-CM

## 2020-06-29 DIAGNOSIS — U07.1 COVID-19 VIRUS DETECTED: ICD-10-CM

## 2020-06-29 PROCEDURE — 99309 SBSQ NF CARE MODERATE MDM 30: CPT | Performed by: FAMILY MEDICINE

## 2020-07-02 ENCOUNTER — NURSING HOME VISIT (OUTPATIENT)
Dept: GERIATRICS | Facility: OTHER | Age: 65
End: 2020-07-02
Payer: MEDICARE

## 2020-07-02 DIAGNOSIS — F02.80 FRONTOTEMPORAL DEMENTIA (HCC): Primary | ICD-10-CM

## 2020-07-02 DIAGNOSIS — R26.2 AMBULATORY DYSFUNCTION: ICD-10-CM

## 2020-07-02 DIAGNOSIS — U07.1 COVID-19 VIRUS DETECTED: ICD-10-CM

## 2020-07-02 DIAGNOSIS — G31.09 FRONTOTEMPORAL DEMENTIA (HCC): Primary | ICD-10-CM

## 2020-07-02 PROCEDURE — 99309 SBSQ NF CARE MODERATE MDM 30: CPT | Performed by: FAMILY MEDICINE

## 2020-07-03 NOTE — PROGRESS NOTES
[address] [facility]  [address]  [phone]  [address] of Service: nursing home place of service: POS [facility] Skilled Care-Part A Coverage      NAME: Иван Leyva  AGE: 77 y o  SEX: male [id number]    DATE OF ENCOUNTER: 7/6/2020    Assessment and Plan     Problem List Items Addressed This Visit        Nervous and Auditory    Frontotemporal dementia ([facility] [address] ) - Primary     No behaviors reported by the staff  Will decrease Seroquel to 25 mg po BID and monitor   Continue the rest of his regimen  Monitor CBC, CMP  Continue PT/OT  Encourage po hydration  Avoid constipation  Other    Ambulatory dysfunction     Continue PT/OT  Goal is to return to [facility]  COVID-19 virus detected     Had one negative test in the facility , will repeat his COVID test and if negative will be considered recovered  Continue isolation for now  He remains asymptomatic  Chief Complaint     Follow up dementia with behaviors    History of Present Illness     Иван Leyva is a 77 y o  male who was seen today for follow up  Pt is non verbal , unable to provide history  As per staff no behaviors noted, eating well, able to eat finger foods on his own  No acute events reported  No fever, falls  Does not seem in discomfort during the visit  As per staff he is sleeping well            The following portions of the patient's history were reviewed and updated as appropriate: allergies, current medications, past family history, past medical history, past social history, past surgical history and problem list     Review of Systems     Review of Systems   Unable to perform ROS: Patient nonverbal       Active Problem List     Patient Active Problem List   Diagnosis    Early onset Alzheimer's dementia without behavioral disturbance ([facility] [address] )    Visual impairment    Incontinence of feces    Weight loss    Ambulatory dysfunction    [facility] of [facility]    Other insomnia    [facility] kidney [facility]) [facility]    Cerebral atherosclerosis    Disorder of prostate    [facility]otemporal dementia ([facility] )    Ground glass opacity present on imaging of lung    Hyperlipidemia    Impaired fasting glucose    Fecal impaction (HCC)    Xerosis cutis    COVID-19 virus detected    Pneumonia due to COVID-19 virus       Objective     Vital Signs:     Blood pressure 124/78 Heart Rate: 74 Respiratory Rate 18   Temperature 97 9 Oxygen Saturation 96%RA    Physical Exam   Constitutional: No distress  HENT:   Head: Normocephalic and atraumatic  Eyes: Pupils are equal, round, and reactive to light  EOM are normal  Right eye exhibits no discharge  Left eye exhibits no discharge  Neck: Normal range of motion  Neck supple  Cardiovascular: Normal rate, regular rhythm and normal heart sounds  No murmur heard  Pulmonary/Chest: Effort normal and breath sounds normal  No respiratory distress  He has no wheezes  Abdominal: Soft  There is no tenderness  There is no rebound and no guarding  Musculoskeletal: He exhibits no edema or tenderness  Neurological: He is alert  Non verbal   Skin: Skin is warm and dry  He is not diaphoretic  Psychiatric:   Non verbal  impulsive   Nursing note and vitals reviewed  Pertinent Laboratory/Diagnostic Studies:  Laboratory and Imaging studies reviewed  Full report in the paper chart  Current Medications   Medications reviewed and updated in facility chart      Name: Sherry Arshad  : 1955  MRN: [id number]  DOS: 2020      Laura Tierney MD  Geriatric Medicine  2020 12:11 PM

## 2020-07-06 ENCOUNTER — NURSING HOME VISIT (OUTPATIENT)
Dept: GERIATRICS | Facility: OTHER | Age: 65
End: 2020-07-06
Payer: MEDICARE

## 2020-07-06 DIAGNOSIS — K56.41 FECAL IMPACTION (HCC): ICD-10-CM

## 2020-07-06 DIAGNOSIS — F02.80 FRONTOTEMPORAL DEMENTIA (HCC): Primary | ICD-10-CM

## 2020-07-06 DIAGNOSIS — U07.1 COVID-19 VIRUS DETECTED: ICD-10-CM

## 2020-07-06 DIAGNOSIS — G31.09 FRONTOTEMPORAL DEMENTIA (HCC): Primary | ICD-10-CM

## 2020-07-06 PROCEDURE — 99309 SBSQ NF CARE MODERATE MDM 30: CPT | Performed by: FAMILY MEDICINE

## 2020-07-06 NOTE — ASSESSMENT & PLAN NOTE
No behaviors reported by the staff  Will decrease Seroquel to 25 mg po BID and monitor   Continue the rest of his regimen  Monitor CBC, CMP  Continue PT/OT  Encourage po hydration  Avoid constipation

## 2020-07-06 NOTE — PROGRESS NOTES
[facility]  Jonny Posey 77  [phone] [address] of Service: nursing home place of service: POS 31 Skilled Care-Part A Coverage      NAME: Janie Buckner  AGE: 77 y o  SEX: male [id number]    DATE OF ENCOUNTER: 7/7/2020    Assessment and Plan     Problem List Items Addressed This Visit        Digestive    Fecal impaction [facility])     Regular BM's reported by the nursing staff  Encourage po hydrion and adjust bowel regimen as needed  Nervous and Auditory    Frontotemporal dementia ([facility] [address] ) - Primary     Stable currently  Improvement with feeding noted  As per staff he answers with yes or no to simple questions at times  Will continue PT/OT  Sleep/wake cycle stable  Will discontinue seroquel in am and continue 25 mg Seroquel QHS  Continue the rest of his regimen: gabapentin, depakote, sertraline  Continue to monitor for behaviors  Monitor CBC, CMP  Other    COVID-19 virus detected     Tested negative twice  Currently asymptomatic  Considered recovered and transitioned to [facility]  Chief Complaint     Follow up dementia    History of Present Illness     Janie Buckner is a 77 y o  male who was seen today for follow up  Pt is non verbal, unable to provide history  As per staff no acute events to report  Eating regular food, eating finger foods on his own, able to use utensils as well  Sleeping well at night  Regular BM noted by the staff             The following portions of the patient's history were reviewed and updated as appropriate: allergies, current medications, past family history, past medical history, past social history, past surgical history and problem list     Review of Systems     Review of Systems   Unable to perform ROS: Patient nonverbal       Active Problem List     Patient Active Problem List   Diagnosis    Early onset Alzheimer's dementia without behavioral disturbance ([facility] [address] )    Visual impairment    Incontinence of feces    Weight loss    Ambulatory dysfunction    Mild episode of recurrent major depressive disorder (HCC)    Other insomnia    MARIANN (acute kidney injury) ([facility] [address] )    Cerebral atherosclerosis    Disorder of prostate    Frontotemporal dementia ([facility] )    Ground glass opacity present on imaging of lung    Hyperlipidemia    Impaired fasting glucose    Fecal impaction (HCC)    Xerosis cutis    COVID-19 virus detected    Pneumonia due to COVID-19 virus       Objective     Vital Signs:     Blood pressure 124/68 Heart Rate: 84 Respiratory Rate 18   Temperature 97 9 Oxygen Saturation 96%RA     Physical Exam   Constitutional: No distress  HENT:   Head: Normocephalic and atraumatic  Eyes: Pupils are equal, round, and reactive to light  EOM are normal  Right eye exhibits no discharge  Left eye exhibits no discharge  Neck: Normal range of motion  Neck supple  Cardiovascular: Normal rate, regular rhythm and normal heart sounds  No murmur heard  Pulmonary/Chest: Effort normal and breath sounds normal  No respiratory distress  He has no wheezes  Abdominal: Soft  There is no tenderness  There is no rebound and no guarding  Musculoskeletal: He exhibits no edema or tenderness  Neurological: He is alert  Non verbal   Skin: Skin is warm and dry  He is not diaphoretic  Psychiatric:   impulsive   Nursing note and vitals reviewed  Pertinent Laboratory/Diagnostic Studies:  Laboratory and Imaging studies reviewed  Full report in the paper chart  Current Medications   Medications reviewed and updated in facility chart      Name: Ijeoma Mtz  : 1955  MRN: [id number]  DOS: 2020      Bethany Bradshaw MD  Geriatric Medicine  2020 10:33 AM

## 2020-07-06 NOTE — ASSESSMENT & PLAN NOTE
Had one negative test in the facility , will repeat his COVID test and if negative will be considered recovered  Continue isolation for now  He remains asymptomatic

## 2020-07-07 NOTE — ASSESSMENT & PLAN NOTE
Regular BM's reported by the nursing staff  Encourage po hydrion and adjust bowel regimen as needed

## 2020-07-07 NOTE — ASSESSMENT & PLAN NOTE
Stable currently  Improvement with feeding noted  As per staff he answers with yes or no to simple questions at times  Will continue PT/OT  Sleep/wake cycle stable  Will discontinue seroquel in am and continue 25 mg Seroquel QHS  Continue the rest of his regimen: gabapentin, depakote, sertraline  Continue to monitor for behaviors  Monitor CBC, CMP

## 2020-07-09 ENCOUNTER — NURSING HOME VISIT (OUTPATIENT)
Dept: GERIATRICS | Facility: OTHER | Age: 65
End: 2020-07-09
Payer: MEDICARE

## 2020-07-09 DIAGNOSIS — K56.41 FECAL IMPACTION (HCC): ICD-10-CM

## 2020-07-09 DIAGNOSIS — F02.80 FRONTOTEMPORAL DEMENTIA (HCC): Primary | ICD-10-CM

## 2020-07-09 DIAGNOSIS — U07.1 PNEUMONIA DUE TO COVID-19 VIRUS: ICD-10-CM

## 2020-07-09 DIAGNOSIS — J12.82 PNEUMONIA DUE TO COVID-19 VIRUS: ICD-10-CM

## 2020-07-09 DIAGNOSIS — R26.2 AMBULATORY DYSFUNCTION: ICD-10-CM

## 2020-07-09 DIAGNOSIS — R05.9 NEW ONSET COUGH: ICD-10-CM

## 2020-07-09 DIAGNOSIS — G31.09 FRONTOTEMPORAL DEMENTIA (HCC): Primary | ICD-10-CM

## 2020-07-09 PROCEDURE — 99309 SBSQ NF CARE MODERATE MDM 30: CPT | Performed by: NURSE PRACTITIONER

## 2020-07-10 NOTE — PROGRESS NOTES
Progress Note    Location: [facility]  POS: 31 (SNF)    Assessment/Plan:    Frontotemporal dementia ([facility] [address] )  Non verbal at baseline  No behavioral disturbance observed since admission  Doing very well with current dose of Quetiapine   Continue Valproic Acid 500mg BID  Continue Quetiapine 25mg at bedtime  Continue Zoloft 100mg daily  Very good meal completion and oral fluid intake: 100%  Renal and hepatic functions WNL (7/6/2020)     Pneumonia due to COVID-19 virus  Deemed clinically recovered  Re-tested x 2: NEGATIVE (7/3/2020)  Limited Pulmonary assessment: non participative  CXR (in hospital) showed ground glass opacities: 5/24/2020  No hypoxia on RA  No cardiopulmonary presentation  VSS  No febrile episode since admission  Very good mean and oral fluid intake: 100% per meal   No anemia (7/6/2020)  WBC and Platelet WNL (3/5/1593)  Renal and hepatic functions WNL (7/6/2020)  Continue V/S Q shift while in  STR      Fecal impaction (HCC)  Review of BM log showed daily bowel movements  Last BM documented: 7/8/2020  Continue Senna Plus at bedtime      Ambulatory dysfunction  Continue 24/7 STR supportive care and management  Continue PT/OT/ST as scheduled  Fall precaution  Coughing  Paroxysmal coughing after taking in liquid meds on 7/8/2020  = reduced but still persistent overnight per report  = no coughing noted during visit  CXR (2 views: 7/9/2020): NEGATIVE  Start Duo-neb BID x 5 days  VSS  No hypoxic episode      Chief complaint / Reason for visit: Follow-up visit    History of Present Illness: This is a  70 y o  Male patient admitted at [facility] (6/17/2020 to present) following discharge from [facility] with Dx of Urosepsis and Pneumonia due to COVID-19 and MARIANN       Patient is still in bed for this visit - AM care on-going with nursing staff present  Patient is alert, non verbal, not in distress; calm, attempts to verbally communicate but speech comes out garbled  Per nursing, report of coughing overnight  No episode of fever or hypoxic episode  Otherwise no acute medical concerns for this visit  Observed paroxysmal coughing during session with speech therapist on 7/8/2020 - assessed to have LCTA  Per nursing, patient started coughing after taking scheduled liquid medication on 7/8/2020 prior to session with ST  Ordered CXR (2 views today) to rule out aspiration Pneumonia - result showed negative acute disease process  Examination is unremarkable  V/S: T97 9F -P73 -R18 BP: 123/70 SpO2: 97% RA      Reviewed labs done on (7/6/2020) and showed CBC without diff WNL; Slightly elevated Cl/ Low Alb/TProtein levels  Renal and hepatic functions WNL  Review of Systems:  Per history of present illness, all other systems reviewed and negative  HISTORY:  Medical Hx: Reviewed, unchanged  Family Hx: Reviewed, unchanged  Soc Hx: Reviewed,  Unchanged    ALLERGY: Reviewed, unchanged  Allergies   Allergen Reactions    Celecoxib     Procaine        PHYSICAL EXAM:  Vital Signs: T97 9F -P73 -R18  BP: 123/70 SpO2: 97% RA  Physical Exam   Constitutional: He appears well-developed and well-nourished  No distress  HENT:   Head: Normocephalic and atraumatic  Nose: Nose normal    B/L impacted cerumen  Unable to assess oral mucosa - non participative  Eyes: Pupils are equal, round, and reactive to light  Conjunctivae are normal  Right eye exhibits no discharge  Left eye exhibits no discharge  Neck: Neck supple  No JVD present  No tracheal deviation present  No thyromegaly present  Cardiovascular: Normal rate, regular rhythm and normal heart sounds  Exam reveals no gallop and no friction rub  No murmur heard  Pulmonary/Chest: Effort normal and breath sounds normal  No stridor  No respiratory distress  He has no wheezes  He has no rales  He exhibits no tenderness  Limited examination - non participative  No coughing observed on this visit  Abdominal: Soft   Bowel sounds are normal  He exhibits no distension and no mass  There is no tenderness  There is no rebound and no guarding  Musculoskeletal: He exhibits no edema, tenderness or deformity  Ambulatory dysfunction  Full dependent for ADL's, transfers and during meals (staff assisted)  Neurological: He is alert  Non verbal   Skin: Skin is warm and dry  Severely thickened toenails    No rash noted  He is not diaphoretic  No erythema  No pallor  B/L Heels intact  B/L Gluteal areas intact  Laboratory results / Imaging reviewed: Hard copies in medical chart:    * CBC without diff (7/6/2020) = WNL    * CMP (7/6/2020) = WNL except:  Cl: 111 (H)  Alb: 3 1 (L)  TPro: 6 0 (L)      Current Medications:   All medications reviewed and updated in [address] [place] [address],  [id number]  7/9/2020

## 2020-07-14 ENCOUNTER — NURSING HOME VISIT (OUTPATIENT)
Dept: GERIATRICS | Facility: OTHER | Age: 65
End: 2020-07-14
Payer: MEDICARE

## 2020-07-14 DIAGNOSIS — F02.80 FRONTOTEMPORAL DEMENTIA (HCC): Primary | ICD-10-CM

## 2020-07-14 DIAGNOSIS — R26.2 AMBULATORY DYSFUNCTION: ICD-10-CM

## 2020-07-14 DIAGNOSIS — R63.4 WEIGHT LOSS: ICD-10-CM

## 2020-07-14 DIAGNOSIS — G31.09 FRONTOTEMPORAL DEMENTIA (HCC): Primary | ICD-10-CM

## 2020-07-14 PROCEDURE — 99309 SBSQ NF CARE MODERATE MDM 30: CPT | Performed by: FAMILY MEDICINE

## 2020-07-14 NOTE — PROGRESS NOTES
[facility]  [address] Posey 77  [phone] [address] of Service: nursing home place of service: POS 32 Unskilled- No Part A Coverage      NAME: Alli Vega  AGE: 77 y o  SEX: male [phone]    DATE OF ENCOUNTER: 7/15/2020    Assessment and Plan     Problem List Items Addressed This Visit        Nervous and Auditory    Frontotemporal dementia ([facility] [address] ) - Primary     Stable, behaviors controlled with current regimen  Will discontinue seroquel and continue to monitor for behaviors  Continue depakote, gabapentin  Reorient as needed, maintain sleep/wake cycle  No insomnia reported by the staff  Avoid constipation  Encourage hydration  Continue to monitor Cbc, CMP  Other    Weight loss     Appetite improved , weight is stable  Continue regular diet and supplements as tolerated  Ambulatory dysfunction     Continue PT/OT, progress noted with therapy  The goal is to return to [facility] Complaint     Follow up dementia with behaviors    History of Present Illness     Alli Vega is a 77 y o  male who was seen today for follow up  He is nonverbal, not able to provide history  As per staff no acute events to report, he has good appetite and sleeps well  No behaviors noted  No fever, chills, cough, diarrhea, constipation            The following portions of the patient's history were reviewed and updated as appropriate: allergies, current medications, past family history, past medical history, past social history, past surgical history and problem list     Review of Systems     Review of Systems   Unable to perform ROS: Dementia       Active Problem List     Patient Active Problem List   Diagnosis    Early onset Alzheimer's dementia without behavioral disturbance ([facility] [address] )    Visual impairment    Incontinence of feces    Weight loss    Ambulatory dysfunction    [facility] of [facility]    Other insomnia    [facility]    Cerebral atherosclerosis    Disorder of prostate    [facility]    Ground glass opacity present on imaging of lung    Hyperlipidemia    Impaired fasting glucose    Fecal impaction (HCC)    Xerosis cutis    COVID-19 virus detected    Pneumonia due to COVID-19 virus    New onset cough       Objective     Vital Signs:     Blood pressure 110/82 Heart Rate: 80 Respiratory Rate 18   Temperature 98 0 Oxygen Saturation 965 RA     Physical Exam   Constitutional: No distress  HENT:   Head: Normocephalic and atraumatic  Eyes: Pupils are equal, round, and reactive to light  EOM are normal  Right eye exhibits no discharge  Left eye exhibits no discharge  Neck: Normal range of motion  Neck supple  Cardiovascular: Normal rate, regular rhythm and normal heart sounds  No murmur heard  Pulmonary/Chest: Effort normal and breath sounds normal  No respiratory distress  He has no wheezes  Abdominal: Soft  There is no tenderness  There is no rebound and no guarding  Musculoskeletal: He exhibits no edema or tenderness  wheelchair   Neurological: He is alert  He exhibits abnormal muscle tone  Nonverbal   Skin: Skin is warm and dry  He is not diaphoretic  Psychiatric:   Nonverbal, no behaviors noted   Nursing note and vitals reviewed  Pertinent Laboratory/Diagnostic Studies:  Laboratory and Imaging studies reviewed  Full report in the paper chart  Current Medications   Medications reviewed and updated in facility chart      Name: Kaykay Alex  : 1955  MRN: [id number]  DOS: 7/15/2020      Fabiana Menjivar MD  Geriatric Medicine  7/15/2020 1:15 PM

## 2020-07-15 ENCOUNTER — NURSING HOME VISIT (OUTPATIENT)
Dept: GERIATRICS | Facility: OTHER | Age: 65
End: 2020-07-15
Payer: MEDICARE

## 2020-07-15 DIAGNOSIS — U07.1 PNEUMONIA DUE TO COVID-19 VIRUS: ICD-10-CM

## 2020-07-15 DIAGNOSIS — J12.82 PNEUMONIA DUE TO COVID-19 VIRUS: ICD-10-CM

## 2020-07-15 DIAGNOSIS — G30.0 EARLY ONSET ALZHEIMER'S DEMENTIA WITHOUT BEHAVIORAL DISTURBANCE (HCC): ICD-10-CM

## 2020-07-15 DIAGNOSIS — R26.2 AMBULATORY DYSFUNCTION: ICD-10-CM

## 2020-07-15 DIAGNOSIS — F02.80 FRONTOTEMPORAL DEMENTIA (HCC): Primary | ICD-10-CM

## 2020-07-15 DIAGNOSIS — G31.09 FRONTOTEMPORAL DEMENTIA (HCC): Primary | ICD-10-CM

## 2020-07-15 DIAGNOSIS — K56.41 FECAL IMPACTION (HCC): ICD-10-CM

## 2020-07-15 DIAGNOSIS — F02.80 EARLY ONSET ALZHEIMER'S DEMENTIA WITHOUT BEHAVIORAL DISTURBANCE (HCC): ICD-10-CM

## 2020-07-15 PROCEDURE — 99316 NF DSCHRG MGMT 30 MIN+: CPT | Performed by: NURSE PRACTITIONER

## 2020-07-15 RX ORDER — DIVALPROEX SODIUM 500 MG/1
500 TABLET, DELAYED RELEASE ORAL EVERY 12 HOURS SCHEDULED
Qty: 5 TABLET
Start: 2020-07-15

## 2020-07-15 NOTE — PROGRESS NOTES
[facility]  [address]  [phone]  [address] [place] [address] St: [facility]  POS: 31: SNF/Short Term Rehab      NAME: Ijeoma Mtz  AGE: 77 y o  SEX: male  DATE OF ADMISSION: JUNE 17, 2020  DATE OF DISCHARGE: July 16, 2020  DISCHARGE DISPOSITION: TO [facility]    Reason for admission: Patient was admitted from [facility] for rehabilitation after hospitalization for Ambulatory dysfunction and physical deconditioning  This is a  70 y o  Male patient admitted at [facility] (6/17/2020 to present) following discharge from [facility] with Dx of Urosepsis and Pneumonia due to COVID-19 and MARIANN  Admission Diagnoses: Urosepsis  Additional Problems: Pneumonia due to COVID-19 virus  Discharge Diagnoses:     * Physical deconditioning and ambulatory dysfunction  * Frontotemporal Dementia  * Urosepsis - resolvede  *  Pneumonia due to COVID-19 virus - resolved  * Fecal impaction - resolved  * MARIANN - resolved      Course of stay: Patient was admitted to [facility] for rehabilitation due to ambulatory dysfunction and physical deconditioning  Patient received 24/7 SNF supportive care, PT/OT/ST  Patient was also slowly weaned down on high dose of Seroquel and have tolerated very well - no agitation, very good appetite, calm/pleasant, no fall incident and no sleep disturbance concerns since admission  Patient stay in STR to date have been uneventful  Patient is scheduled for discharge to [facility] effective 7/16/2020  Per , patient will continue PT/OT/ST as out-patient service       PT Progress Note: Per PT/OT progress notes, patient needs:    * Hand to hand assistance during ambulation  = ambulates approximately 200 feet  * Moderate assistance for upper body ADLs'  * Maximum assistance for lower body ADLs'  * Poor carry over of directions       Labs and testing performed during stay: Copies of laboratory/ imaging/ consult will be provided to PCP upon discharge  * CXR (2 views): 7/9/2020  * CMP (7/6/2020, 6/29/2020, 6/19/2020)  * CBC w/o diff (7/6/2020, 6/29/2020, 6/19/2020)  * COVID-19 test (7/1/2020, 6/24/2020, 6/19/2020, 6/17/2020)      Discharge Medications: See discharge medication list which was reviewed and signed  Status at time of discharge: Stable    Today's Visit: 7/15/78756:39 AM      Subjective: Non-verbal status    Vitals:T97 4F -P78 -R18 BP: 114/98 SpO2: 97% RA  Weight: 142 0 lbs (7/9/2020) <= 140 6 lbs (6/17/2020)    Review of Systems   Unable to perform ROS: Patient nonverbal       Exam: Physical Exam   Constitutional: He appears well-developed and well-nourished  No distress  Alert, pleasant, cooperative  Not in distress, Calm  HENT:   Head: Normocephalic and atraumatic  Nose: Nose normal    Mouth/Throat: Oropharynx is clear and moist  No oropharyngeal exudate  B/L impacted cerumen   Eyes: Pupils are equal, round, and reactive to light  Conjunctivae are normal  Right eye exhibits no discharge  Left eye exhibits no discharge  No scleral icterus  Neck: Neck supple  No JVD present  No tracheal deviation present  No thyromegaly present  Cardiovascular: Regular rhythm and normal heart sounds  Exam reveals no gallop and no friction rub  No murmur heard  Slightly tachycardic   Pulmonary/Chest: Effort normal and breath sounds normal  No stridor  No respiratory distress  He has no wheezes  He has no rales  He exhibits no tenderness  Abdominal: Soft  Bowel sounds are normal  He exhibits no distension and no mass  There is no tenderness  There is no rebound and no guarding  No hernia  Last BM documented: 7/14/2020   Genitourinary:   Genitourinary Comments: Non distended bladder  Musculoskeletal: He exhibits no edema, tenderness or deformity  ambulatory dysfunction - uses manual wheel chair - full dependent with ADLs' and transfer and ambulation   Lymphadenopathy:     He has no cervical adenopathy  Neurological: He is alert  Non verbal  Engaged in watching TV and lego blocks   Skin: Skin is warm and dry  No rash noted  He is not diaphoretic  No erythema  No pallor  Dry skin  Small scab to Left knee area - possibly from scratching  B/L heels intact and B/L gluteal areas intact  Thickened long toenails   Psychiatric: He has a normal mood and affect  Discussion with patient/family and further instructions:  -Fall precautions  -Aspiration precautions  -Bleeding precautions  -Monitor for signs/symptoms of infection  -Medication list was reviewed and signed  -DME form was completed  Follow-up Recommendations:     * Please follow-up with your primary care physician within 7-10 days of discharge to review medication changes and current status  The facility has set-up an appointment for your GABRIELLA CM with your PCP (Dr Ayo Kurtz Fax: [phone]) on July 21, 2020 at 11:00AM  Please make every effort to attend this appointment  Should there be a change in your medical condition and an earlier appointment is needed please call the number provided above  * Recommend PT/OT/ST as an out-patient service to continue strengthening, gait, balance and overall functional independence improvement gained during in-patient rehabilitation  Problem List Follow-up Recommendations:    Frontotemporal dementia ([address] )  Non verbal at baseline  No behavioral disturbance observed since admission  Doing very well with current dose of of Quetiapine   Continue Valproic Acid 500mg BID  Continue Quetiapine 25mg at bedtime  Continue Zoloft 100mg daily  Very good meal completion and oral fluid intake: 100%  Renal and hepatic functions WNL (7/6/2020)     Pneumonia due to COVID-19 virus  Deemed clinically recovered  Re-tested x 2: NEGATIVE (7/3/2020)  Limited Pulmonary assessment: non participative  Repeat CXR (2 views) done on 7/92020: NEGATIVE  = CXR (in hospital) showed ground glass opacities: 5/24/2020  No hypoxia on RA  No cardiopulmonary presentation  VSS  No febrile episode since admission  Very good mean and oral fluid intake: 100% per meal   No anemia (7/6/2020)  WBC and Platelet WNL (8/3/0539)  Renal and hepatic functions WNL (7/6/2020)  Continue V/S Q shift while in  STR      Fecal impaction (HCC)  Review of BM log showed daily bowel movements  Last BM documented: 7/14/2020  Continue Senna Plus at bedtime      Ambulatory dysfunction  Patient is scheduled for discharge back to [facility] on 7/16/2020  Please see recommendations as indicated above  Fall precaution        Discharge Statement:    * Spent more than 30 minutes discharging the patient; greater than 50% spent on physical examination of patient, answering questions, discussion of plan of care, recommendations and providing post discharge instructions  Additional time spend on other discharge related activities including documentation        Abhi Spencer  7/15/83261:39 AM

## 2020-07-15 NOTE — ASSESSMENT & PLAN NOTE
Stable, behaviors controlled with current regimen  Will discontinue seroquel and continue to monitor for behaviors  Continue depakote, gabapentin  Reorient as needed, maintain sleep/wake cycle  No insomnia reported by the staff  Avoid constipation  Encourage hydration  Continue to monitor Cbc, CMP

## 2020-10-01 ENCOUNTER — APPOINTMENT (EMERGENCY)
Dept: CT IMAGING | Facility: HOSPITAL | Age: 65
End: 2020-10-01
Payer: MEDICARE

## 2020-10-01 ENCOUNTER — HOSPITAL ENCOUNTER (EMERGENCY)
Facility: HOSPITAL | Age: 65
Discharge: NURSING, REHAB OR LONG-TERM CARE | End: 2020-10-02
Attending: EMERGENCY MEDICINE | Admitting: EMERGENCY MEDICINE
Payer: MEDICARE

## 2020-10-01 DIAGNOSIS — R56.9 SEIZURE (HCC): Primary | ICD-10-CM

## 2020-10-01 LAB
ALBUMIN SERPL BCP-MCNC: 3.4 G/DL (ref 3.5–5)
ALP SERPL-CCNC: 52 U/L (ref 46–116)
ALT SERPL W P-5'-P-CCNC: 27 U/L (ref 12–78)
ANION GAP SERPL CALCULATED.3IONS-SCNC: 9 MMOL/L (ref 4–13)
AST SERPL W P-5'-P-CCNC: 20 U/L (ref 5–45)
ATRIAL RATE: 277 BPM
BASOPHILS # BLD AUTO: 0.09 THOUSANDS/ΜL (ref 0–0.1)
BASOPHILS NFR BLD AUTO: 2 % (ref 0–1)
BILIRUB SERPL-MCNC: 0.5 MG/DL (ref 0.2–1)
BUN SERPL-MCNC: 15 MG/DL (ref 5–25)
CALCIUM ALBUM COR SERPL-MCNC: 9.2 MG/DL (ref 8.3–10.1)
CALCIUM SERPL-MCNC: 8.7 MG/DL (ref 8.3–10.1)
CHLORIDE SERPL-SCNC: 107 MMOL/L (ref 100–108)
CK SERPL-CCNC: 151 U/L (ref 39–308)
CO2 SERPL-SCNC: 26 MMOL/L (ref 21–32)
CREAT SERPL-MCNC: 0.71 MG/DL (ref 0.6–1.3)
EOSINOPHIL # BLD AUTO: 0.12 THOUSAND/ΜL (ref 0–0.61)
EOSINOPHIL NFR BLD AUTO: 2 % (ref 0–6)
ERYTHROCYTE [DISTWIDTH] IN BLOOD BY AUTOMATED COUNT: 12.9 % (ref 11.6–15.1)
GFR SERPL CREATININE-BSD FRML MDRD: 97 ML/MIN/1.73SQ M
GLUCOSE SERPL-MCNC: 96 MG/DL (ref 65–140)
GLUCOSE SERPL-MCNC: 99 MG/DL (ref 65–140)
HCT VFR BLD AUTO: 41 % (ref 36.5–49.3)
HGB BLD-MCNC: 12.8 G/DL (ref 12–17)
IMM GRANULOCYTES # BLD AUTO: 0.03 THOUSAND/UL (ref 0–0.2)
IMM GRANULOCYTES NFR BLD AUTO: 1 % (ref 0–2)
LACTATE SERPL-SCNC: 1.1 MMOL/L (ref 0.5–2)
LACTATE SERPL-SCNC: 2.1 MMOL/L (ref 0.5–2)
LYMPHOCYTES # BLD AUTO: 1.28 THOUSANDS/ΜL (ref 0.6–4.47)
LYMPHOCYTES NFR BLD AUTO: 23 % (ref 14–44)
MCH RBC QN AUTO: 30.8 PG (ref 26.8–34.3)
MCHC RBC AUTO-ENTMCNC: 31.2 G/DL (ref 31.4–37.4)
MCV RBC AUTO: 99 FL (ref 82–98)
MONOCYTES # BLD AUTO: 0.48 THOUSAND/ΜL (ref 0.17–1.22)
MONOCYTES NFR BLD AUTO: 9 % (ref 4–12)
NEUTROPHILS # BLD AUTO: 3.5 THOUSANDS/ΜL (ref 1.85–7.62)
NEUTS SEG NFR BLD AUTO: 63 % (ref 43–75)
NRBC BLD AUTO-RTO: 0 /100 WBCS
PLATELET # BLD AUTO: 292 THOUSANDS/UL (ref 149–390)
PMV BLD AUTO: 10.9 FL (ref 8.9–12.7)
POTASSIUM SERPL-SCNC: 4.4 MMOL/L (ref 3.5–5.3)
PROT SERPL-MCNC: 6.6 G/DL (ref 6.4–8.2)
QRS AXIS: -27 DEGREES
QRSD INTERVAL: 78 MS
QT INTERVAL: 378 MS
QTC INTERVAL: 449 MS
RBC # BLD AUTO: 4.15 MILLION/UL (ref 3.88–5.62)
SODIUM SERPL-SCNC: 142 MMOL/L (ref 136–145)
T WAVE AXIS: 24 DEGREES
VALPROATE SERPL-MCNC: 4 UG/ML (ref 50–100)
VENTRICULAR RATE: 85 BPM
WBC # BLD AUTO: 5.5 THOUSAND/UL (ref 4.31–10.16)

## 2020-10-01 PROCEDURE — 80053 COMPREHEN METABOLIC PANEL: CPT | Performed by: EMERGENCY MEDICINE

## 2020-10-01 PROCEDURE — 96361 HYDRATE IV INFUSION ADD-ON: CPT

## 2020-10-01 PROCEDURE — 82948 REAGENT STRIP/BLOOD GLUCOSE: CPT

## 2020-10-01 PROCEDURE — 70450 CT HEAD/BRAIN W/O DYE: CPT

## 2020-10-01 PROCEDURE — 93005 ELECTROCARDIOGRAM TRACING: CPT

## 2020-10-01 PROCEDURE — 99285 EMERGENCY DEPT VISIT HI MDM: CPT | Performed by: EMERGENCY MEDICINE

## 2020-10-01 PROCEDURE — 96366 THER/PROPH/DIAG IV INF ADDON: CPT

## 2020-10-01 PROCEDURE — 99285 EMERGENCY DEPT VISIT HI MDM: CPT

## 2020-10-01 PROCEDURE — 96365 THER/PROPH/DIAG IV INF INIT: CPT

## 2020-10-01 PROCEDURE — 83605 ASSAY OF LACTIC ACID: CPT | Performed by: EMERGENCY MEDICINE

## 2020-10-01 PROCEDURE — G1004 CDSM NDSC: HCPCS

## 2020-10-01 PROCEDURE — 80164 ASSAY DIPROPYLACETIC ACD TOT: CPT | Performed by: EMERGENCY MEDICINE

## 2020-10-01 PROCEDURE — 85025 COMPLETE CBC W/AUTO DIFF WBC: CPT | Performed by: EMERGENCY MEDICINE

## 2020-10-01 PROCEDURE — 93010 ELECTROCARDIOGRAM REPORT: CPT | Performed by: INTERNAL MEDICINE

## 2020-10-01 PROCEDURE — 82550 ASSAY OF CK (CPK): CPT | Performed by: EMERGENCY MEDICINE

## 2020-10-01 PROCEDURE — 36415 COLL VENOUS BLD VENIPUNCTURE: CPT | Performed by: EMERGENCY MEDICINE

## 2020-10-01 RX ORDER — SENNA AND DOCUSATE SODIUM 50; 8.6 MG/1; MG/1
1 TABLET, FILM COATED ORAL
COMMUNITY
Start: 2020-06-17 | End: 2021-06-17

## 2020-10-01 RX ORDER — ACETAMINOPHEN 325 MG/1
650 TABLET ORAL EVERY 6 HOURS PRN
COMMUNITY

## 2020-10-01 RX ORDER — DIVALPROEX SODIUM 500 MG/1
500 TABLET, DELAYED RELEASE ORAL EVERY 8 HOURS SCHEDULED
Qty: 90 TABLET | Refills: 0 | Status: SHIPPED | OUTPATIENT
Start: 2020-10-01 | End: 2020-10-31

## 2020-10-01 RX ORDER — ONDANSETRON 4 MG/1
4 TABLET, FILM COATED ORAL EVERY 8 HOURS PRN
COMMUNITY

## 2020-10-01 RX ORDER — LANOLIN ALCOHOL/MO/W.PET/CERES
3 CREAM (GRAM) TOPICAL DAILY PRN
COMMUNITY
Start: 2020-06-17 | End: 2021-06-17

## 2020-10-01 RX ORDER — ASPIRIN 81 MG/1
81 TABLET, CHEWABLE ORAL DAILY
COMMUNITY

## 2020-10-01 RX ORDER — DIVALPROEX SODIUM 500 MG/1
500 TABLET, DELAYED RELEASE ORAL EVERY 8 HOURS SCHEDULED
Qty: 90 TABLET | Refills: 0 | Status: SHIPPED | OUTPATIENT
Start: 2020-10-01 | End: 2020-10-01 | Stop reason: SDUPTHER

## 2020-10-01 RX ADMIN — SODIUM CHLORIDE 1000 ML: 0.9 INJECTION, SOLUTION INTRAVENOUS at 18:42

## 2020-10-01 RX ADMIN — VALPROATE SODIUM 1308 MG: 100 INJECTION, SOLUTION INTRAVENOUS at 22:03

## 2020-10-02 VITALS
BODY MASS INDEX: 22.58 KG/M2 | DIASTOLIC BLOOD PRESSURE: 57 MMHG | RESPIRATION RATE: 16 BRPM | SYSTOLIC BLOOD PRESSURE: 105 MMHG | WEIGHT: 144.18 LBS | TEMPERATURE: 97.5 F | HEART RATE: 64 BPM | OXYGEN SATURATION: 97 %

## 2021-01-29 ENCOUNTER — TELEPHONE (OUTPATIENT)
Dept: OTHER | Facility: OTHER | Age: 66
End: 2021-01-29

## 2021-01-29 NOTE — TELEPHONE ENCOUNTER
Nancy Late [birth year]      [address] [facility]    antoine [phone]       Redness on back, pt was sweaty, states likely heat rash asking for order for powder for rash     Rocco Irving

## 2023-02-27 ENCOUNTER — TELEPHONE (OUTPATIENT)
Dept: FAMILY MEDICINE CLINIC | Facility: CLINIC | Age: 68
End: 2023-02-27